# Patient Record
Sex: FEMALE | Race: WHITE | URBAN - METROPOLITAN AREA
[De-identification: names, ages, dates, MRNs, and addresses within clinical notes are randomized per-mention and may not be internally consistent; named-entity substitution may affect disease eponyms.]

---

## 2017-01-03 ENCOUNTER — TELEPHONE (OUTPATIENT)
Dept: OBGYN CLINIC | Facility: CLINIC | Age: 27
End: 2017-01-03

## 2017-01-03 NOTE — TELEPHONE ENCOUNTER
Pt states that she is having heavy bleeding. Pt states that she is soaking through tampons every 15 mins. Please advise.

## 2017-01-03 NOTE — TELEPHONE ENCOUNTER
Pt states took Nuvaring out Thursday and started bleeding on Saturday (like a normal period) and today has been saturating a super tampon every 20 minutes passing clots size of quarter. Pt states took 2 pills of IBU about 20 minutes ago for bleeding.   Pt

## 2017-02-23 RX ORDER — ETONOGESTREL/ETHINYL ESTRADIOL .12-.015MG
RING, VAGINAL VAGINAL
Refills: 0 | OUTPATIENT
Start: 2017-02-23

## 2017-03-12 ENCOUNTER — HOSPITAL (OUTPATIENT)
Dept: OTHER | Age: 27
End: 2017-03-12
Attending: EMERGENCY MEDICINE

## 2017-09-19 ENCOUNTER — TELEPHONE (OUTPATIENT)
Dept: OBGYN CLINIC | Facility: CLINIC | Age: 27
End: 2017-09-19

## 2017-09-19 NOTE — TELEPHONE ENCOUNTER
Pt states that she has a regular period 8/9. Pt inserted new nuvaring 8/17 (1 day late). Pt started bleeding again 8/23. Pt states flow was moderate so she removed nuvaring and wore tampon. Pt has not had another period yet.  Home pregnancy test last week w

## 2017-09-19 NOTE — TELEPHONE ENCOUNTER
The pt states that her menses is late and she would like to set up an appt. Per the pt she has not had a positive pregnancy test, but her LMP was on 8/9/17. Please advise.

## 2017-10-16 ENCOUNTER — TELEPHONE (OUTPATIENT)
Dept: PEDIATRICS CLINIC | Facility: CLINIC | Age: 27
End: 2017-10-16

## 2017-10-16 NOTE — TELEPHONE ENCOUNTER
Pt confirms message below and LMP of 9/19/17. Pt stated she is on PNV with [facility]. Pt informed she will rotate through all 6 MDs (male and female) and the MD on call will be the one to deliver. Pt verbalized understanding.   Pt informed her first appt will be

## 2017-11-13 ENCOUNTER — TELEPHONE (OUTPATIENT)
Dept: OBGYN CLINIC | Facility: CLINIC | Age: 27
End: 2017-11-13

## 2017-11-13 NOTE — TELEPHONE ENCOUNTER
Does pt still need appointment for OBN?; opening 11-21-17;  Also pt scheduled a fertility Consult ector't on 11-22-17; please verify

## 2017-11-13 NOTE — TELEPHONE ENCOUNTER
Lmtcb---It appears that pt scheduled fertility consult appt on 10/13 but then called on 10/16 with +HPT. When pt calls back, please assist her with scheduling OBN for next week.